# Patient Record
Sex: FEMALE | Race: WHITE | NOT HISPANIC OR LATINO | RURAL
[De-identification: names, ages, dates, MRNs, and addresses within clinical notes are randomized per-mention and may not be internally consistent; named-entity substitution may affect disease eponyms.]

---

## 2022-03-02 NOTE — PATIENT DISCUSSION
Patient has a family history of ARMD.  Discussed diet and UV protection.  Maculas unremarkable today.

## 2022-11-14 ENCOUNTER — CONSULTATION/EVALUATION (OUTPATIENT)
Dept: RURAL CLINIC 3 | Facility: CLINIC | Age: 57
End: 2022-11-14

## 2022-11-14 DIAGNOSIS — H26.492: ICD-10-CM

## 2022-11-14 DIAGNOSIS — H25.11: ICD-10-CM

## 2022-11-14 DIAGNOSIS — H40.031: ICD-10-CM

## 2022-11-14 PROCEDURE — 99204 OFFICE O/P NEW MOD 45 MIN: CPT

## 2022-11-14 PROCEDURE — 66821 AFTER CATARACT LASER SURGERY: CPT

## 2022-11-14 ASSESSMENT — VISUAL ACUITY
OS_BAT: 20/150
OS_CC: 20/50
OD_CC: 20/60
OD_BAT: 20/200
OD_PH: 20/50-1

## 2022-11-14 ASSESSMENT — TONOMETRY
OD_IOP_MMHG: 18
OS_IOP_MMHG: 18

## 2022-11-18 ENCOUNTER — CLINIC PROCEDURE ONLY (OUTPATIENT)
Dept: RURAL CLINIC 3 | Facility: CLINIC | Age: 57
End: 2022-11-18

## 2022-11-18 DIAGNOSIS — H40.031: ICD-10-CM

## 2022-11-18 PROCEDURE — 66761 REVISION OF IRIS: CPT

## 2022-11-18 ASSESSMENT — VISUAL ACUITY
OD_PH: 20/50-1
OD_BAT: 20/200
OD_SC: 20/60
OS_SC: 20/50
OS_BAT: 20/150

## 2022-11-28 ENCOUNTER — CONSULTATION/EVALUATION (OUTPATIENT)
Dept: RURAL CLINIC 3 | Facility: CLINIC | Age: 57
End: 2022-11-28

## 2022-11-28 DIAGNOSIS — H25.11: ICD-10-CM

## 2022-11-28 PROCEDURE — 99214 OFFICE O/P EST MOD 30 MIN: CPT

## 2022-11-28 ASSESSMENT — VISUAL ACUITY
OD_PH: 20/40-1
OD_CC: 20/40-1
OD_SC: 20/90
OD_PAM: 20/20
OD_BAT: 20/150
OD_CC: 20/50

## 2022-12-02 ENCOUNTER — PRE-OP/H&P (OUTPATIENT)
Dept: RURAL CLINIC 3 | Facility: CLINIC | Age: 57
End: 2022-12-02

## 2022-12-02 VITALS
BODY MASS INDEX: 34.6 KG/M2 | WEIGHT: 188 LBS | HEIGHT: 62 IN | HEART RATE: 60 BPM | SYSTOLIC BLOOD PRESSURE: 145 MMHG | DIASTOLIC BLOOD PRESSURE: 87 MMHG

## 2022-12-02 PROCEDURE — 99499 UNLISTED E&M SERVICE: CPT

## 2022-12-02 ASSESSMENT — VISUAL ACUITY
OD_BAT: 20/150
OD_PAM: 20/20
OD_CC: 20/50
OD_PH: 20/40-1
OD_CC: 20/40-1
OD_SC: 20/90

## 2022-12-16 ENCOUNTER — SURGERY/PROCEDURE (OUTPATIENT)
Dept: RURAL CLINIC 3 | Facility: CLINIC | Age: 57
End: 2022-12-16

## 2022-12-16 ENCOUNTER — POST-OP (OUTPATIENT)
Dept: RURAL CLINIC 3 | Facility: CLINIC | Age: 57
End: 2022-12-16

## 2022-12-16 PROCEDURE — 68841 INSJ RX ELUT IMPLT LAC CANAL: CPT

## 2022-12-16 PROCEDURE — 99024 POSTOP FOLLOW-UP VISIT: CPT

## 2022-12-16 PROCEDURE — 66984 XCAPSL CTRC RMVL W/O ECP: CPT

## 2022-12-16 ASSESSMENT — TONOMETRY: OD_IOP_MMHG: 16

## 2022-12-16 ASSESSMENT — VISUAL ACUITY: OD_SC: 20/80

## 2022-12-21 ENCOUNTER — POST-OP (OUTPATIENT)
Dept: RURAL CLINIC 3 | Facility: CLINIC | Age: 57
End: 2022-12-21

## 2022-12-21 PROCEDURE — 99024 POSTOP FOLLOW-UP VISIT: CPT

## 2022-12-21 ASSESSMENT — VISUAL ACUITY
OD_SC: 20/25-2
OS_PH: 20/25-1
OS_SC: 20/40-2

## 2022-12-21 ASSESSMENT — TONOMETRY
OS_IOP_MMHG: 16
OD_IOP_MMHG: 16